# Patient Record
Sex: FEMALE | Race: WHITE | Employment: OTHER | URBAN - METROPOLITAN AREA
[De-identification: names, ages, dates, MRNs, and addresses within clinical notes are randomized per-mention and may not be internally consistent; named-entity substitution may affect disease eponyms.]

---

## 2024-04-29 ENCOUNTER — HOSPITAL ENCOUNTER (EMERGENCY)
Age: 39
Discharge: HOME | End: 2024-04-29
Attending: EMERGENCY MEDICINE

## 2024-04-29 ENCOUNTER — APPOINTMENT (OUTPATIENT)
Dept: GENERAL RADIOLOGY | Age: 39
End: 2024-04-29
Attending: EMERGENCY MEDICINE

## 2024-04-29 VITALS
WEIGHT: 184 LBS | OXYGEN SATURATION: 99 % | BODY MASS INDEX: 32.6 KG/M2 | DIASTOLIC BLOOD PRESSURE: 84 MMHG | SYSTOLIC BLOOD PRESSURE: 134 MMHG | RESPIRATION RATE: 16 BRPM | HEIGHT: 63 IN | TEMPERATURE: 98 F | HEART RATE: 74 BPM

## 2024-04-29 DIAGNOSIS — R07.89 CHEST WALL PAIN: Primary | ICD-10-CM

## 2024-04-29 LAB
ALBUMIN SERPL-MCNC: 3.6 G/DL (ref 3.4–5)
ALBUMIN/GLOB SERPL: 0.8 {RATIO} (ref 1–2)
ALP LIVER SERPL-CCNC: 63 U/L
ALT SERPL-CCNC: 20 U/L
ANION GAP SERPL CALC-SCNC: 7 MMOL/L (ref 0–18)
AST SERPL-CCNC: 12 U/L (ref 15–37)
B-HCG UR QL: NEGATIVE
BASOPHILS # BLD AUTO: 0.05 X10(3) UL (ref 0–0.2)
BASOPHILS NFR BLD AUTO: 0.5 %
BILIRUB SERPL-MCNC: 0.7 MG/DL (ref 0.1–2)
BUN BLD-MCNC: 9 MG/DL (ref 9–23)
CALCIUM BLD-MCNC: 9 MG/DL (ref 8.5–10.1)
CHLORIDE SERPL-SCNC: 106 MMOL/L (ref 98–112)
CO2 SERPL-SCNC: 24 MMOL/L (ref 21–32)
CREAT BLD-MCNC: 0.77 MG/DL
D DIMER PPP FEU-MCNC: 0.36 UG/ML FEU (ref ?–0.5)
EGFRCR SERPLBLD CKD-EPI 2021: 102 ML/MIN/1.73M2 (ref 60–?)
EOSINOPHIL # BLD AUTO: 0.2 X10(3) UL (ref 0–0.7)
EOSINOPHIL NFR BLD AUTO: 2 %
ERYTHROCYTE [DISTWIDTH] IN BLOOD BY AUTOMATED COUNT: 15.9 %
GLOBULIN PLAS-MCNC: 4.3 G/DL (ref 2.8–4.4)
GLUCOSE BLD-MCNC: 110 MG/DL (ref 70–99)
HCT VFR BLD AUTO: 37.7 %
HGB BLD-MCNC: 12.3 G/DL
IMM GRANULOCYTES # BLD AUTO: 0.03 X10(3) UL (ref 0–1)
IMM GRANULOCYTES NFR BLD: 0.3 %
LYMPHOCYTES # BLD AUTO: 2.4 X10(3) UL (ref 1–4)
LYMPHOCYTES NFR BLD AUTO: 24.3 %
MCH RBC QN AUTO: 26.9 PG (ref 26–34)
MCHC RBC AUTO-ENTMCNC: 32.6 G/DL (ref 31–37)
MCV RBC AUTO: 82.3 FL
MONOCYTES # BLD AUTO: 0.57 X10(3) UL (ref 0.1–1)
MONOCYTES NFR BLD AUTO: 5.8 %
NEUTROPHILS # BLD AUTO: 6.62 X10 (3) UL (ref 1.5–7.7)
NEUTROPHILS # BLD AUTO: 6.62 X10(3) UL (ref 1.5–7.7)
NEUTROPHILS NFR BLD AUTO: 67.1 %
OSMOLALITY SERPL CALC.SUM OF ELEC: 283 MOSM/KG (ref 275–295)
PLATELET # BLD AUTO: 285 10(3)UL (ref 150–450)
POTASSIUM SERPL-SCNC: 3.7 MMOL/L (ref 3.5–5.1)
PROT SERPL-MCNC: 7.9 G/DL (ref 6.4–8.2)
RBC # BLD AUTO: 4.58 X10(6)UL
SODIUM SERPL-SCNC: 137 MMOL/L (ref 136–145)
TROPONIN I SERPL HS-MCNC: <3 NG/L
WBC # BLD AUTO: 9.9 X10(3) UL (ref 4–11)

## 2024-04-29 PROCEDURE — 99284 EMERGENCY DEPT VISIT MOD MDM: CPT

## 2024-04-29 PROCEDURE — 36415 COLL VENOUS BLD VENIPUNCTURE: CPT

## 2024-04-29 PROCEDURE — 93005 ELECTROCARDIOGRAM TRACING: CPT

## 2024-04-29 PROCEDURE — 99285 EMERGENCY DEPT VISIT HI MDM: CPT

## 2024-04-29 PROCEDURE — 85379 FIBRIN DEGRADATION QUANT: CPT | Performed by: EMERGENCY MEDICINE

## 2024-04-29 PROCEDURE — 85025 COMPLETE CBC W/AUTO DIFF WBC: CPT | Performed by: EMERGENCY MEDICINE

## 2024-04-29 PROCEDURE — 84484 ASSAY OF TROPONIN QUANT: CPT | Performed by: EMERGENCY MEDICINE

## 2024-04-29 PROCEDURE — 80053 COMPREHEN METABOLIC PANEL: CPT | Performed by: EMERGENCY MEDICINE

## 2024-04-29 PROCEDURE — 71045 X-RAY EXAM CHEST 1 VIEW: CPT | Performed by: EMERGENCY MEDICINE

## 2024-04-29 PROCEDURE — 93010 ELECTROCARDIOGRAM REPORT: CPT

## 2024-04-29 PROCEDURE — 81025 URINE PREGNANCY TEST: CPT

## 2024-04-29 NOTE — ED INITIAL ASSESSMENT (HPI)
Reports woke with sternal chest pain.  States since the am it has started to spread over chest and into her back.

## 2024-04-29 NOTE — DISCHARGE INSTRUCTIONS
Tylenol or ibuprofen for pain as needed.  Regular activity as tolerated.  All tests are normal here.

## 2024-04-29 NOTE — ED PROVIDER NOTES
Patient Seen in: [facility] [place]      History     Chief Complaint   Patient presents with    Chest Pressure     Stated Complaint: Chest pressure    Subjective:   HPI    Patient is a 38-year-old female with no past medical history who presents for evaluation of chest pain.  She reports she woke up this morning with very mild substernal chest pressure, only 1 or 2 out of 10.  It went away, short time later returned and has been fairly constant since.  It radiates straight through to her back.  No associated symptoms like shortness of breath, nausea, diaphoresis.  She has never had this before.    Objective:   History reviewed. No pertinent past medical history.           History reviewed. No pertinent surgical history.             Social History     Socioeconomic History    Marital status: Single   Tobacco Use    Smoking status: Some Days     Types: Cigarettes    Smokeless tobacco: Never   Vaping Use    Vaping status: Every Day   Substance and Sexual Activity    Alcohol use: Yes     Comment: daily - 4 drinks a day    Drug use: Yes     Types: Cannabis     Comment: frequently     Social Determinants of Health     Financial Resource Strain: Not on File (2023)    Received from [employer]     Financial Resource Strain     Financial Resource Strain: 0   Food Insecurity: Not on File (2023)    Received from [facility]     Food Insecurity     Food: 0   Transportation Needs: Not on File (2023)    Received from [facility]     Transportation Needs     Transportation: 0   Physical Activity: Not on File (2023)    Received from [facility]     Physical Activity     Physical Activity: 0   Stress: Not on File (2023)    Received from [facility]     Stress     Stress: 0   Social Connections: Not on File (2023)    Received from [facility]     Social Connections     Social Connections and Isolation: 0   Housing Stability: Not on File (2023)    Received from [employer]     Housing Stability     Housin               Review of Systems    Positive for stated complaint: Chest pressure  Other systems are as noted in HPI.  Constitutional and vital signs reviewed.      All other systems reviewed and negative except as noted above.    Physical Exam     ED Triage Vitals [04/29/24 1508]   BP (!) 164/101   Pulse 76   Resp 14   Temp 98.4 °F (36.9 °C)   Temp src Oral   SpO2 97 %   O2 Device None (Room air)       Current:BP (!) 149/93   Pulse 74   Temp 98.4 °F (36.9 °C) (Oral)   Resp 15   Ht 160 cm (5' 3\")   Wt 83.5 kg   LMP 04/08/2024   SpO2 97%   BMI 32.59 kg/m²         Physical Exam  Vitals and nursing note reviewed.   Constitutional:       Appearance: She is well-developed.   HENT:      Head: Normocephalic and atraumatic.   Eyes:      Conjunctiva/sclera: Conjunctivae normal.      Pupils: Pupils are equal, round, and reactive to light.   Cardiovascular:      Rate and Rhythm: Normal rate and regular rhythm.      Heart sounds: Normal heart sounds.      Comments: Reproducible midsternal tenderness to palpation.  No crepitus or deformity.  Pulmonary:      Effort: Pulmonary effort is normal.      Breath sounds: Normal breath sounds.   Abdominal:      General: Bowel sounds are normal.      Palpations: Abdomen is soft.   Musculoskeletal:         General: Normal range of motion.      Cervical back: Normal range of motion and neck supple.   Skin:     General: Skin is warm and dry.   Neurological:      Mental Status: She is alert and oriented to person, place, and time.               ED Course     Labs Reviewed   COMP METABOLIC PANEL (14) - Abnormal; Notable for the following components:       Result Value    Glucose 110 (*)     AST 12 (*)     A/G Ratio 0.8 (*)     All other components within normal limits   TROPONIN I HIGH SENSITIVITY - Normal   D-DIMER - Normal   POCT PREGNANCY URINE - Normal   CBC WITH DIFFERENTIAL WITH PLATELET    Narrative:     The following orders were created for panel order CBC With Differential With  Platelet.  Procedure                               Abnormality         Status                     ---------                               -----------         ------                     CBC W/ DIFFERENTIAL[[id number]]                              Final result                 Please view results for these tests on the individual orders.   RAINBOW DRAW LAVENDER   RAINBOW DRAW LIGHT GREEN   RAINBOW DRAW BLUE   CBC W/ DIFFERENTIAL     EKG    Rate, intervals and axes as noted on EKG Report.  Rate: 73  Rhythm: Sinus Rhythm  Reading: Sinus rhythm with sinus arrhythmia.  No ST segment or T wave changes.  No old EKG available for comparison.             Heart Score:    HEART Score      Title      Criteria Score   Age: <45 Age Score: 0   History: Slightly Suspicious Hx Score: 0     EKG: Normal EKG Score: 0   HTN: No   Hypercholesterolemia: No   Atherosclerosis/PVD: No     DM: No   BMI>30kg/m2: Yes   Smoking: Yes   Family History: No         Other Risk Factor Score: 2             Lab Results   Component Value Date    TROPHS <3 04/29/2024           HEART Score: 1        Risk of adverse cardiac event is 0.9-1.7%                 XR CHEST AP PORTABLE  (CPT=71045)    Result Date: 4/29/2024  PROCEDURE:  XR CHEST AP PORTABLE  (CPT=71045)  TECHNIQUE:  AP chest radiograph was obtained.  COMPARISON:  None.  INDICATIONS:  Chest pressure  PATIENT STATED HISTORY: (As transcribed by Technologist)  Mid chest pain since this morning, sudden onset.    FINDINGS:  Lung volumes are satisfactory.  No focal consolidation.  CP angles are sharp.  No pneumothorax.  Heart and pulmonary vessels are normal caliber.  Mediastinal contours are smooth.             CONCLUSION:  No evidence of active cardiopulmonary disease.   LOCATION:  [facility]      Dictated by (CST): Lance Tran MD on 4/29/2024 at 4:16 PM     Finalized by (CST): Lance Tran MD on 4/29/2024 at 4:16 PM               [facility]      38-year-old female presenting for evaluation of chest pain.  Woke up  with it this morning, it went away but then returned.  Has been fairly minimal the whole time, only 1 or 2 out of 10.  No associated symptoms.  She is hypertensive here and will observe that for now.  No prior history of hypertension.  Differential occludes ACS, PE, pleurisy, dissection.  It is reproducible with palpation I suspect this is probably chest wall pain but will check labs to rule out more serious pathology.      Update at 4:45 PM.  Workup is unremarkable with negative troponin, negative D-dimer, normal chest x-ray.  Patient remains comfortable here.  Blood pressure has trended down.  Serious pathology has been ruled out.  With the reproducible tenderness I do think this is consistent with chest wall pain.        Past Medical History-none    Differential diagnosis before testing included ACS, PE, chest wall pain    Co-morbidities that add to the complexity of management include: None    Testing ordered during this visit included labs, EKG, chest x-ray     Radiographic images  I personally reviewed the radiographs and my individual interpretation shows no infiltrate or pneumothorax  I also reviewed the official reports that showed no infiltrate or pneumothorax            Disposition:          Discharge  I have discussed with the patient the results of test, differential diagnosis, treatment plan, warning signs and symptoms which should prompt immediate return.  They expressed understanding of these instructions and agrees to the following plan provided.  They were given written discharge instructions and agrees to return for any concerns and voiced understanding and all questions were answered.                           Medical Decision Making      Disposition and Plan     Clinical Impression:  1. Chest wall pain         Disposition:  Discharge  4/29/2024  4:48 pm    Follow-up:  Anish Kelsey MD  [address] [place] [id number]  [phone]    Follow up            Medications  Prescribed:  There are no discharge medications for this patient.

## 2024-04-30 LAB
ATRIAL RATE: 73 BPM
P AXIS: 26 DEGREES
P-R INTERVAL: 126 MS
Q-T INTERVAL: 374 MS
QRS DURATION: 72 MS
QTC CALCULATION (BEZET): 412 MS
R AXIS: 15 DEGREES
T AXIS: 29 DEGREES
VENTRICULAR RATE: 73 BPM